# Patient Record
Sex: FEMALE | Race: WHITE | NOT HISPANIC OR LATINO | Employment: UNEMPLOYED | URBAN - METROPOLITAN AREA
[De-identification: names, ages, dates, MRNs, and addresses within clinical notes are randomized per-mention and may not be internally consistent; named-entity substitution may affect disease eponyms.]

---

## 2024-04-10 ENCOUNTER — TELEPHONE (OUTPATIENT)
Dept: PSYCHIATRY | Facility: CLINIC | Age: 54
End: 2024-04-10

## 2024-04-10 ENCOUNTER — OFFICE VISIT (OUTPATIENT)
Dept: INTERNAL MEDICINE CLINIC | Facility: OTHER | Age: 54
End: 2024-04-10
Payer: MEDICARE

## 2024-04-10 VITALS
TEMPERATURE: 98.1 F | HEART RATE: 81 BPM | OXYGEN SATURATION: 97 % | HEIGHT: 62 IN | WEIGHT: 184.2 LBS | BODY MASS INDEX: 33.9 KG/M2 | SYSTOLIC BLOOD PRESSURE: 110 MMHG | DIASTOLIC BLOOD PRESSURE: 82 MMHG

## 2024-04-10 DIAGNOSIS — J30.2 SEASONAL ALLERGIES: ICD-10-CM

## 2024-04-10 DIAGNOSIS — Z76.89 ENCOUNTER TO ESTABLISH CARE: ICD-10-CM

## 2024-04-10 DIAGNOSIS — M51.36 DDD (DEGENERATIVE DISC DISEASE), LUMBAR: ICD-10-CM

## 2024-04-10 DIAGNOSIS — M54.50 CHRONIC LOW BACK PAIN, UNSPECIFIED BACK PAIN LATERALITY, UNSPECIFIED WHETHER SCIATICA PRESENT: ICD-10-CM

## 2024-04-10 DIAGNOSIS — G89.29 CHRONIC LOW BACK PAIN, UNSPECIFIED BACK PAIN LATERALITY, UNSPECIFIED WHETHER SCIATICA PRESENT: ICD-10-CM

## 2024-04-10 DIAGNOSIS — F32.A DEPRESSION, UNSPECIFIED DEPRESSION TYPE: ICD-10-CM

## 2024-04-10 DIAGNOSIS — Z13.1 SCREENING FOR DIABETES MELLITUS: ICD-10-CM

## 2024-04-10 DIAGNOSIS — Z13.220 SCREENING FOR LIPID DISORDERS: ICD-10-CM

## 2024-04-10 DIAGNOSIS — Z12.4 SCREENING FOR CERVICAL CANCER: ICD-10-CM

## 2024-04-10 DIAGNOSIS — F31.9 BIPOLAR AFFECTIVE DISORDER, REMISSION STATUS UNSPECIFIED (HCC): Primary | ICD-10-CM

## 2024-04-10 DIAGNOSIS — Z12.31 ENCOUNTER FOR SCREENING MAMMOGRAM FOR MALIGNANT NEOPLASM OF BREAST: ICD-10-CM

## 2024-04-10 DIAGNOSIS — Z13.29 SCREENING FOR THYROID DISORDER: ICD-10-CM

## 2024-04-10 DIAGNOSIS — K21.9 GASTROESOPHAGEAL REFLUX DISEASE, UNSPECIFIED WHETHER ESOPHAGITIS PRESENT: ICD-10-CM

## 2024-04-10 DIAGNOSIS — Z72.0 TOBACCO ABUSE: ICD-10-CM

## 2024-04-10 DIAGNOSIS — Z13.0 SCREENING FOR DEFICIENCY ANEMIA: ICD-10-CM

## 2024-04-10 DIAGNOSIS — F90.9 ATTENTION DEFICIT HYPERACTIVITY DISORDER (ADHD), UNSPECIFIED ADHD TYPE: ICD-10-CM

## 2024-04-10 DIAGNOSIS — F41.9 ANXIETY: ICD-10-CM

## 2024-04-10 PROCEDURE — 99204 OFFICE O/P NEW MOD 45 MIN: CPT | Performed by: NURSE PRACTITIONER

## 2024-04-10 RX ORDER — ACETAMINOPHEN 500 MG
500 TABLET ORAL EVERY 6 HOURS PRN
COMMUNITY

## 2024-04-10 RX ORDER — OMEPRAZOLE 20 MG/1
20 CAPSULE, DELAYED RELEASE ORAL DAILY
Qty: 90 CAPSULE | Refills: 0 | Status: SHIPPED | OUTPATIENT
Start: 2024-04-10

## 2024-04-10 RX ORDER — ARIPIPRAZOLE 20 MG/1
20 TABLET ORAL DAILY
COMMUNITY

## 2024-04-10 RX ORDER — SERTRALINE HYDROCHLORIDE 100 MG/1
200 TABLET, FILM COATED ORAL DAILY
COMMUNITY
End: 2024-04-10 | Stop reason: SDUPTHER

## 2024-04-10 RX ORDER — FEXOFENADINE HCL 180 MG/1
180 TABLET ORAL DAILY
Qty: 90 TABLET | Refills: 0 | Status: SHIPPED | OUTPATIENT
Start: 2024-04-10

## 2024-04-10 RX ORDER — PSEUDOEPHEDRINE HCL 30 MG
60 TABLET ORAL EVERY 8 HOURS PRN
Qty: 30 TABLET | Refills: 0 | Status: SHIPPED | OUTPATIENT
Start: 2024-04-10

## 2024-04-10 NOTE — ASSESSMENT & PLAN NOTE
- previously following with [facility] receiving injections in her lumbar spine  - referral made to [facility]  - continue tylenol prn

## 2024-04-10 NOTE — ASSESSMENT & PLAN NOTE
Medical hx reviewed with patient   Update labs as ordered  Will have office obtain records from previous pcp

## 2024-04-10 NOTE — PATIENT INSTRUCTIONS
Start fexofenadine once daily   Start sudafed as needed for ongoing ear pain     Start omeprazole daily for acid reflux    Restart sertraline 50mg daily. Start with 1/2 tablet for the first week.     Go for labs, fasting

## 2024-04-10 NOTE — PROGRESS NOTES
Assessment/Plan:    Problem List Items Addressed This Visit       Attention deficit hyperactivity disorder (ADHD)     - no current medications  - referral made to follow up with psychiatry          Relevant Medications    ARIPiprazole (ABILIFY) 20 MG tablet    sertraline (ZOLOFT) 50 mg tablet    Other Relevant Orders    Ambulatory referral to Psych Services    Bipolar disorder (HCC) - Primary     - previously on sertraline 200mg and Abilify 20mg per her report. Stopped all of her medications several months ago  - obtain records from previous PCP  - reports last seeing psychiatry in 2011   - restart sertraline 50mg daily (1/2 tablet x 1 week then increase to 50mg)  - referral made to psychiatry   - follow up 1 month           Relevant Medications    ARIPiprazole (ABILIFY) 20 MG tablet    sertraline (ZOLOFT) 50 mg tablet    Other Relevant Orders    Ambulatory referral to Psych Services    Anxiety     - previously on sertraline 200mg and Abilify 20mg per her report. Stopped all of her medications several months ago  - restart sertraline 50mg daily (1/2 tablet x 1 week then increase to 50mg)  - referral made to psychiatry   - follow up 1 month  - will have office obtain records from previous PCP         Relevant Medications    sertraline (ZOLOFT) 50 mg tablet    Other Relevant Orders    TSH, 3rd generation with Free T4 reflex    Ambulatory referral to Psych Services    Gastroesophageal reflux disease     - start omeprazole 20mg daily   - if symptoms uncontrolled, will refer to GI as she has never had an EGD   - encouraged to quit smoking          Relevant Medications    omeprazole (PriLOSEC) 20 mg delayed release capsule    DDD (degenerative disc disease), lumbar     - previously following with [facility] receiving injections in her lumbar spine  - referral made to [facility]  - continue tylenol prn          Relevant Orders    Ambulatory referral to Spine & Pain Management    Chronic low back  pain     - previously following with [facility] receiving injections in her lumbar spine  - referral made to [facility]  - continue tylenol prn          Relevant Orders    Ambulatory referral to Spine & Pain Management    Depression     - previously on sertraline 200mg and Abilify 20mg per her report. Stopped all of her medications several months ago  - restart sertraline 50mg daily (1/2 tablet x 1 week then increase to 50mg)  - No SI or HI  - referral made to psychiatry   - follow up 1 month  - will have office obtain records from previous PCP         Relevant Medications    ARIPiprazole (ABILIFY) 20 MG tablet    sertraline (ZOLOFT) 50 mg tablet    Other Relevant Orders    TSH, 3rd generation with Free T4 reflex    Ambulatory referral to Psych Services    Seasonal allergies    Relevant Medications    fexofenadine (ALLEGRA) 180 MG tablet    pseudoephedrine (SUDAFED) 30 mg tablet    Encounter to establish care     Medical hx reviewed with patient   Update labs as ordered  Will have office obtain records from previous pcp         Tobacco abuse     - current daily smoker smoking 5 cigarettes per day, also using e-cigarettes           Other Visit Diagnoses       Screening for lipid disorders        Relevant Orders    Lipid Panel with Direct LDL reflex    Screening for diabetes mellitus        Relevant Orders    Comprehensive metabolic panel    Screening for thyroid disorder        Relevant Orders    TSH, 3rd generation with Free T4 reflex    Screening for cervical cancer        Relevant Orders    Ambulatory Referral to Obstetrics / Gynecology    Encounter for screening mammogram for malignant neoplasm of breast        Relevant Orders    Mammo screening bilateral w 3d & cad    Screening for deficiency anemia        Relevant Orders    CBC and differential                 M*Modal software was used to dictate this note.  It may contain errors with dictating incorrect words or incorrect spelling.  Please contact the provider directly with any questions.    Subjective:      Patient ID: Laura Tripp is a 53 y.o. female.    HPI    Patient presents today to [facility] care with our office  She was previously living in [place] and is new to this area  She was following with Dr Navya Petit in [place], [place]. Last seen about 1 year ago.   She was following with a pain management clinic- [facility]. She was receiving injections in her lumbar spine, last injection was approx 1 year ago. Prior to that she was receiving them about every 4 months.   She tells me she ran out of her medications several months ago.   She is adopted so she does not know her family history  She does have a hx of drug abuse. She has a hole in her nasal septum  She reports she completed a cologuard about 1-2 years ago that was normal     She is complaining today of bilateral ear pain for the last few weeks     Medical hx significant for:    GERD - she is currently on omeprazole daily. She is unsure of the dose. She has never had an EGD. She is using OTC medication     Mental health diagnoses include: ADHD, anxiety, depression, bipolar disorder, PTSD.   She tells me she has not seen a psychiatrist since 2011.   She was last on sertraline 200mg daily and Abilify 20mg daily.  She states she has very bad anxiety. SOB with panic attacks. She states she is very antisocial.   She denies any SI or HI  She has an emotional support dog, he is a Goldendoodle.     Chronic low back pain- ongoing since 2019. She reports degenerative disc disease. She reports pain radiating down her legs, burning sensation in the front of her legs. She reports chronic overuse. She was seeing a spine dr in [place] for injections. Last seen approx 1 year ago.  [facility]    Current daily smoker - she is smoking 5 cigarettes per day. She is also using e-cigarettes     The following portions of the patient's history were reviewed and updated as appropriate: allergies,  "current medications, past family history, past medical history, past social history, past surgical history, and problem list.    Review of Systems   Constitutional:  Negative for chills and fever.   HENT:  Positive for ear pain and sore throat. Negative for congestion, ear discharge, postnasal drip, rhinorrhea, sinus pressure, sinus pain and sneezing.    Respiratory:  Negative for cough and chest tightness.    Psychiatric/Behavioral:  Positive for dysphoric mood. Negative for self-injury and suicidal ideas. The patient is nervous/anxious.          Past Medical History:   Diagnosis Date    ADHD     Anxiety     Bipolar disorder (HCC)     Depression     GERD (gastroesophageal reflux disease)     PTSD (post-traumatic stress disorder)          Current Outpatient Medications:     acetaminophen (TYLENOL) 500 mg tablet, Take 500 mg by mouth every 6 (six) hours as needed for mild pain, Disp: , Rfl:     fexofenadine (ALLEGRA) 180 MG tablet, Take 1 tablet (180 mg total) by mouth daily, Disp: 90 tablet, Rfl: 0    omeprazole (PriLOSEC) 20 mg delayed release capsule, Take 1 capsule (20 mg total) by mouth daily, Disp: 90 capsule, Rfl: 0    pseudoephedrine (SUDAFED) 30 mg tablet, Take 2 tablets (60 mg total) by mouth every 8 (eight) hours as needed for congestion, Disp: 30 tablet, Rfl: 0    sertraline (ZOLOFT) 50 mg tablet, Take 1 tablet (50 mg total) by mouth daily, Disp: 90 tablet, Rfl: 0    ARIPiprazole (ABILIFY) 20 MG tablet, Take 20 mg by mouth daily, Disp: , Rfl:     No Known Allergies    Social History   Past Surgical History:   Procedure Laterality Date    CHOLECYSTECTOMY      TONSILLECTOMY       History reviewed. No pertinent family history.    Objective:  /82 (BP Location: Left arm, Patient Position: Sitting, Cuff Size: Standard)   Pulse 81   Temp 98.1 °F (36.7 °C) (Temporal)   Ht 5' 2.01\" (1.575 m)   Wt 83.6 kg (184 lb 3.2 oz)   SpO2 97%   BMI 33.68 kg/m²      Physical Exam  Vitals reviewed.   Constitutional: "       General: She is not in acute distress.     Appearance: Normal appearance. She is not diaphoretic.   HENT:      Head: Normocephalic and atraumatic.      Right Ear: Tympanic membrane and external ear normal.      Left Ear: Tympanic membrane and external ear normal.      Nose: Rhinorrhea present.      Comments: Hole in septum      Mouth/Throat:      Mouth: Mucous membranes are moist.      Pharynx: Oropharynx is clear. Posterior oropharyngeal erythema (mild) present. No oropharyngeal exudate.   Eyes:      Extraocular Movements: Extraocular movements intact.      Conjunctiva/sclera: Conjunctivae normal.      Pupils: Pupils are equal, round, and reactive to light.   Cardiovascular:      Rate and Rhythm: Normal rate and regular rhythm.      Heart sounds: Normal heart sounds. No murmur heard.  Pulmonary:      Effort: Pulmonary effort is normal. No respiratory distress.      Breath sounds: Normal breath sounds. No wheezing, rhonchi or rales.   Skin:     General: Skin is warm and dry.   Neurological:      Mental Status: She is alert and oriented to person, place, and time. Mental status is at baseline.   Psychiatric:         Mood and Affect: Mood normal.         Behavior: Behavior normal.

## 2024-04-10 NOTE — ASSESSMENT & PLAN NOTE
- previously on sertraline 200mg and Abilify 20mg per her report. Stopped all of her medications several months ago  - obtain records from previous PCP  - reports last seeing psychiatry in 2011   - restart sertraline 50mg daily (1/2 tablet x 1 week then increase to 50mg)  - referral made to psychiatry   - follow up 1 month

## 2024-04-10 NOTE — ASSESSMENT & PLAN NOTE
- start omeprazole 20mg daily   - if symptoms uncontrolled, will refer to GI as she has never had an EGD   - encouraged to quit smoking

## 2024-04-10 NOTE — ASSESSMENT & PLAN NOTE
- previously on sertraline 200mg and Abilify 20mg per her report. Stopped all of her medications several months ago  - restart sertraline 50mg daily (1/2 tablet x 1 week then increase to 50mg)  - referral made to psychiatry   - follow up 1 month  - will have office obtain records from previous PCP

## 2024-04-10 NOTE — ASSESSMENT & PLAN NOTE
- previously on sertraline 200mg and Abilify 20mg per her report. Stopped all of her medications several months ago  - restart sertraline 50mg daily (1/2 tablet x 1 week then increase to 50mg)  - No SI or HI  - referral made to psychiatry   - follow up 1 month  - will have office obtain records from previous PCP

## 2024-07-11 DIAGNOSIS — F32.A DEPRESSION, UNSPECIFIED DEPRESSION TYPE: ICD-10-CM

## 2024-07-11 DIAGNOSIS — F41.9 ANXIETY: ICD-10-CM

## 2024-07-17 ENCOUNTER — TELEPHONE (OUTPATIENT)
Age: 54
End: 2024-07-17

## 2024-07-17 NOTE — TELEPHONE ENCOUNTER
Unable to leave message on machine; mailbox full. Need to schedule missed appointment.    Please try reaching out again.